# Patient Record
Sex: MALE | Race: WHITE | NOT HISPANIC OR LATINO | Employment: FULL TIME | URBAN - METROPOLITAN AREA
[De-identification: names, ages, dates, MRNs, and addresses within clinical notes are randomized per-mention and may not be internally consistent; named-entity substitution may affect disease eponyms.]

---

## 2019-09-30 ENCOUNTER — HOSPITAL ENCOUNTER (EMERGENCY)
Facility: HOSPITAL | Age: 34
Discharge: HOME | End: 2019-09-30
Attending: EMERGENCY MEDICINE | Admitting: EMERGENCY MEDICINE

## 2019-09-30 VITALS
HEART RATE: 82 BPM | HEIGHT: 71 IN | WEIGHT: 207.23 LBS | DIASTOLIC BLOOD PRESSURE: 56 MMHG | SYSTOLIC BLOOD PRESSURE: 92 MMHG | OXYGEN SATURATION: 94 % | BODY MASS INDEX: 29.01 KG/M2 | TEMPERATURE: 98.7 F | RESPIRATION RATE: 16 BRPM

## 2019-09-30 DIAGNOSIS — F19.10 SUBSTANCE ABUSE (HCC): Primary | ICD-10-CM

## 2019-09-30 PROCEDURE — 99283 EMERGENCY DEPT VISIT LOW MDM: CPT

## 2022-06-10 ENCOUNTER — HOSPITAL ENCOUNTER (EMERGENCY)
Facility: HOSPITAL | Age: 37
End: 2022-06-10

## 2022-06-10 ENCOUNTER — HOSPITAL ENCOUNTER (EMERGENCY)
Facility: HOSPITAL | Age: 37
Discharge: HOME | End: 2022-06-10
Attending: EMERGENCY MEDICINE | Admitting: EMERGENCY MEDICINE

## 2022-06-10 ENCOUNTER — APPOINTMENT (OUTPATIENT)
Dept: CT IMAGING | Facility: HOSPITAL | Age: 37
End: 2022-06-10

## 2022-06-10 VITALS
HEIGHT: 71 IN | RESPIRATION RATE: 18 BRPM | OXYGEN SATURATION: 95 % | TEMPERATURE: 97.8 F | HEART RATE: 59 BPM | DIASTOLIC BLOOD PRESSURE: 69 MMHG | BODY MASS INDEX: 25 KG/M2 | SYSTOLIC BLOOD PRESSURE: 109 MMHG | WEIGHT: 178.6 LBS

## 2022-06-10 DIAGNOSIS — S01.81XA LACERATION OF FOREHEAD, INITIAL ENCOUNTER: ICD-10-CM

## 2022-06-10 DIAGNOSIS — S00.83XA CONTUSION OF FOREHEAD, INITIAL ENCOUNTER: Primary | ICD-10-CM

## 2022-06-10 PROCEDURE — 90471 IMMUNIZATION ADMIN: CPT | Performed by: NURSE PRACTITIONER

## 2022-06-10 PROCEDURE — 25010000002 TETANUS-DIPHTH-ACELL PERTUSSIS 5-2.5-18.5 LF-MCG/0.5 SUSPENSION PREFILLED SYRINGE: Performed by: NURSE PRACTITIONER

## 2022-06-10 PROCEDURE — 70450 CT HEAD/BRAIN W/O DYE: CPT

## 2022-06-10 PROCEDURE — 90715 TDAP VACCINE 7 YRS/> IM: CPT | Performed by: NURSE PRACTITIONER

## 2022-06-10 PROCEDURE — 99282 EMERGENCY DEPT VISIT SF MDM: CPT

## 2022-06-10 RX ADMIN — TETANUS TOXOID, REDUCED DIPHTHERIA TOXOID AND ACELLULAR PERTUSSIS VACCINE, ADSORBED 0.5 ML: 5; 2.5; 8; 8; 2.5 SUSPENSION INTRAMUSCULAR at 01:52

## 2022-06-10 NOTE — ED PROVIDER NOTES
Subjective   Patient is a 37-year-old white male with no significant medical history who presents today with complaints of an injury to his forehead.  He states he was at work today when he was struck in the forehead by a large piece of metal.  States he had no LOC.  He does complain of mild headache.  Denies dizziness visual changes unilateral weakness or deficit.  He denies blood thinner use.  He denies any neck pain or back pain.  Denies any nausea vomiting since the incident.  He also complains of a small laceration to the center of the forehead.  He is unsure of his last tetanus booster.          Review of Systems   Eyes: Negative.  Negative for visual disturbance.   Gastrointestinal: Negative for nausea and vomiting.   Musculoskeletal: Negative for neck pain.        Forehead contusion   Skin: Positive for wound.        Forehead laceration   Neurological: Positive for headaches. Negative for dizziness, syncope, facial asymmetry, weakness, light-headedness and numbness.       No past medical history on file.    No Known Allergies    No past surgical history on file.    No family history on file.    Social History     Socioeconomic History   • Marital status: Single           Objective   Physical Exam  Vital signs and triage nurse note reviewed.  Constitutional: Awake, alert; well-developed and well-nourished. No acute distress is noted.  HEENT: Normocephalic; pupils are PERRL with intact EOM; oropharynx is pink and moist without exudate or erythema.  No drooling or pooling of oral secretions.  Small contusion noted over the upper forehead centrally.  There is mild tenderness without crepitus or step-off.  No sotelo sign noted.  No raccoon eyes.  No hemotympanum.  Neck: Supple, full range of motion without pain; no cervical lymphadenopathy. Normal phonation.  Cardiovascular: Regular rate and rhythm, normal S1-S2.  No murmur noted.  Pulmonary: Respiratory effort regular nonlabored, breath sounds clear to  auscultation all fields.  Musculoskeletal: Independent range of motion of all extremities with no palpable tenderness or edema.  Neuro: Alert oriented x3, speech is clear and appropriate, GCS 15.    Skin: Flesh tone, warm, dry.  1 cm superficial laceration noted over the center of the forehead.  There is no active bleeding.      Laceration Repair    Date/Time: 6/10/2022 2:35 AM  Performed by: Peggy Sky APRN  Authorized by: Jeffy Lenz MD     Consent:     Consent obtained:  Verbal    Consent given by:  Patient    Risks, benefits, and alternatives were discussed: yes      Risks discussed:  Pain, infection and poor cosmetic result  Universal protocol:     Immediately prior to procedure, a time out was called: yes      Patient identity confirmed:  Verbally with patient and arm band  Anesthesia:     Anesthesia method:  Local infiltration    Local anesthetic:  Lidocaine 1% WITH epi  Laceration details:     Location:  Face    Face location:  Forehead    Length (cm):  1  Pre-procedure details:     Preparation:  Patient was prepped and draped in usual sterile fashion  Treatment:     Area cleansed with:  Shur-Clens and saline    Amount of cleaning:  Standard    Irrigation solution:  Sterile saline  Skin repair:     Repair method:  Sutures    Suture size:  6-0    Suture material:  Nylon    Suture technique:  Simple interrupted    Number of sutures:  4  Approximation:     Approximation:  Close  Repair type:     Repair type:  Simple  Post-procedure details:     Dressing:  Antibiotic ointment    Procedure completion:  Tolerated well, no immediate complications               ED Course      Labs Reviewed - No data to display  CT Head Without Contrast    Result Date: 6/10/2022  1.  No acute intracranial abnormality. Electronically signed by:  Jeb Leach DO  6/10/2022 12:01 AM    Medications   Tetanus-Diphth-Acell Pertussis (BOOSTRIX) injection 0.5 mL (0.5 mL Intramuscular Given 6/10/22 0152)                                                 MDM  Number of Diagnoses or Management Options  Contusion of forehead, initial encounter  Laceration of forehead, initial encounter  Diagnosis management comments: Patient had CT of the head obtained that shows no acute intracranial abnormality.  He has laceration repaired as noted above.  He was given a tetanus booster.    He is remained well-appearing throughout his ED stay and in no distress.  He is remained neurologically stable.  He is hemodynamically stable and ambulatory without difficulty.    Diagnosis and treatment plan discussed with patient.  Patient agreeable to plan.   I discussed findings with patient who voices understanding of discharge instructions, signs and symptoms requiring return to ED; discharged improved and in stable condition with follow up for re-evaluation.         Amount and/or Complexity of Data Reviewed  Tests in the radiology section of CPT®: reviewed and ordered    Patient Progress  Patient progress: stable      Final diagnoses:   Contusion of forehead, initial encounter   Laceration of forehead, initial encounter       ED Disposition  ED Disposition     ED Disposition   Discharge    Condition   Stable    Comment   --             [facility]  [address] [place] [address] [address]  [place] [address] [phone]  In 5 days  For suture removal, For wound re-check         Medication List      No changes were made to your prescriptions during this visit.          Peggy Sky, APRN  06/10/22 0236

## 2022-06-10 NOTE — DISCHARGE INSTRUCTIONS
Head injury precautions.  May use Tylenol or ibuprofen for pain.  Ice 20 minutes at a time several times a day for the next 2 days.  Keep the wound clean with soap and water daily. Apply antibiotic ointment daily. Sutures/staples to be removed in 5-7 days.  Follow-up with occupational medicine for suture removal and wound check in 5 to 7 days.  Return for new or worsening symptoms.

## 2022-06-10 NOTE — ED NOTES
Pt had wound irrigated using saline water. Wound was scrubbed with BD EZ scrub. Irrigated with saline water. Sprayed with MicroKlenz spray. Provider was notified.